# Patient Record
Sex: MALE
[De-identification: names, ages, dates, MRNs, and addresses within clinical notes are randomized per-mention and may not be internally consistent; named-entity substitution may affect disease eponyms.]

---

## 2020-01-27 PROBLEM — Z00.00 ENCOUNTER FOR PREVENTIVE HEALTH EXAMINATION: Status: ACTIVE | Noted: 2020-01-27

## 2020-02-06 ENCOUNTER — APPOINTMENT (OUTPATIENT)
Dept: NEUROLOGY | Facility: CLINIC | Age: 60
End: 2020-02-06

## 2020-06-24 ENCOUNTER — TRANSCRIPTION ENCOUNTER (OUTPATIENT)
Age: 60
End: 2020-06-24

## 2020-06-24 ENCOUNTER — APPOINTMENT (OUTPATIENT)
Dept: NEUROLOGY | Facility: CLINIC | Age: 60
End: 2020-06-24
Payer: COMMERCIAL

## 2020-06-24 VITALS — WEIGHT: 207 LBS | BODY MASS INDEX: 28.98 KG/M2 | HEIGHT: 71 IN

## 2020-06-24 DIAGNOSIS — Z78.9 OTHER SPECIFIED HEALTH STATUS: ICD-10-CM

## 2020-06-24 DIAGNOSIS — Z87.438 PERSONAL HISTORY OF OTHER DISEASES OF MALE GENITAL ORGANS: ICD-10-CM

## 2020-06-24 DIAGNOSIS — Z81.8 FAMILY HISTORY OF OTHER MENTAL AND BEHAVIORAL DISORDERS: ICD-10-CM

## 2020-06-24 PROCEDURE — 99215 OFFICE O/P EST HI 40 MIN: CPT | Mod: 95

## 2020-06-24 RX ORDER — ALPRAZOLAM 0.25 MG/1
0.25 TABLET ORAL
Refills: 0 | Status: ACTIVE | COMMUNITY
Start: 2020-06-24

## 2020-06-24 RX ORDER — ATORVASTATIN CALCIUM 20 MG/1
20 TABLET, FILM COATED ORAL
Qty: 30 | Refills: 3 | Status: ACTIVE | COMMUNITY
Start: 2020-06-24

## 2020-06-24 RX ORDER — ALFUZOSIN HYDROCHLORIDE 10 MG/1
10 TABLET, EXTENDED RELEASE ORAL DAILY
Refills: 0 | Status: ACTIVE | COMMUNITY
Start: 2020-06-24

## 2020-06-24 NOTE — DISCUSSION/SUMMARY
[FreeTextEntry1] : Mr. Diaz is a 59 year old man who has been experiencing anxiety and vague symptoms of dizziness/floatiness.\par He is not describing any focal symptoms or any vertigo.\par He had a cardiac workup.\par \par A limited examination performed via televisit is non-focal.\par \par It does seem that anxiety may be contributing to his symptoms.\par He had problems with Effexor - urinary symptoms.\par He is interested in trying an SSRI. I am prescribing sertraline 25 mg x 2 weeks and then 50 mg/day. Side effects were discussed including the possibility of worsening mood.\par \par He should return to yoga when his neck feels better.\par \par Persistent perceptual postural dizziness may also be a problem. \par \par We discussed MRI brain as well. He would like to see if symptoms improve with sertraline and if not he is agreeable to MRI brain. \par EEG can also be considered if not feeling better.\par \par Records from previous office will also be retrieved. \par \par He should call with any questions or concerns.

## 2020-06-24 NOTE — DATA REVIEWED
[de-identified] : Echocardiogram 4/24/20:\par Normal LV size, with concentric remodeling and grossly normal systolic function with estimated EF of 60-65%\par Reduced LV compliance\par Normal size left atrium and right atrium.\par No sigfnicant valvular abnormality.\par \par Treadmill Stress test 4/29/20: Normal\par \par Carotid duplex 4/24/20: No evidence of significant carotid stenosis bilaterally

## 2020-06-24 NOTE — REVIEW OF SYSTEMS
[Anxiety] : anxiety [Nocturia] : nocturia [As Noted in HPI] : as noted in HPI [Negative] : Gastrointestinal

## 2020-06-24 NOTE — PHYSICAL EXAM
[FreeTextEntry1] : Examination:\par Patient is well appearing\par Neurological Examination:\par Mental Status: Awake, alert. Oriented to person, place, time\par Language: No aphasia\par Cranial Nerves:\par  EOMI, No facial weakness, tongue protrudes in the midline\par Motor Exam: No pronator drift. Barrel roll intact. Fine motor movements intact in hands\par Able to stand up from chair without difficulty\par Able to stand on one foot\par Sensory: intact on self exam\par Reflexes: Unable to examine\par Cerebellar: Finger to nose intact bilaterally\par \par \par

## 2020-06-24 NOTE — HISTORY OF PRESENT ILLNESS
[Home] : at home, [unfilled] , at the time of the visit. [Medical Office: ([facility])___] : at the medical office located in  [Verbal consent obtained from patient] : the patient, [unfilled] [FreeTextEntry1] : \par This is a telehealth visit that was performed with the originating site at the patient’s home and the distant site of my office at [address] [place] [address].\par Two way audio and visual technology was used. \par Verbal consent to participate in the telephone/video visit was obtained in lieu of in-person signature due to the coronavirus emergency.\par This particular visit occurred during the 2020 COVID-19 emergency. I discussed with the patient the nature of our telehealth visits, that:\par -I would evaluate the patient and recommend diagnostics and treatments based on my assessment.\par -Our sessions are not being recorded.\par -The patient acknowledged the risk of unsecure transmission of his/her information.\par -Our team would provide follow up care in person if/when the patient needs it.\par \par He reports that recently he has been feeling very anxious.\par Anxiety has been present for about one year.\par He is not sure if he is anxious about his medical symptoms or if his symptoms are a result of anxiety.\par \par He says that his father recently passed away on 1/29/20. He had dementia at the end of life.\par He was working with a psychiatrist. \par \par His symptoms include feelings of lightheadedness and wooziness.\par He feels a little out of it. \par He does not feel as if he is going to lose his balance but he does not feel "all there". He feels "floaty" as if He might lose his bearings. \par He does not notice this feeling when he is focused on an activity.\par He sometimes feels this when he steps off his bike after a ride.\par \par When he was under a lot of stress he found that he had difficulty with concentration. \par He was on Effexor previously. He tried to restart Effexor but his urologist wanted him to stop it.\par Effexor was previously helpful for his anxiety. He has not been on it steadily for the last two years. \par He was working with a psychiatrist prior to the pandemic. \par He had blurry vision when he was on Lexapro for one week.\par \par He rarely takes Xanax 0.25 mg to help him sleep.\par He usually sleeps pretty well. He will get up at 3 AM to urinate. \par He wakes up at about 7 AM. He finds himself dreading the day when he first wakes up.\par He does not believe that he snores except for when he is on his back.\par \par He had a cardiac workup including echocardiogram, stress test and carotid ultrasound.\par \par He has had some "stress headaches" arising from the back of his neck over the last 1-2 months. Aspirin usually is helpful. \par He denies having any weakness on one side more than the other.\par He does not have any focal numbness/tingling. \par \par \par His mother and father had dementia, starting in their 80s.

## 2020-06-25 ENCOUNTER — TRANSCRIPTION ENCOUNTER (OUTPATIENT)
Age: 60
End: 2020-06-25

## 2020-07-09 ENCOUNTER — TRANSCRIPTION ENCOUNTER (OUTPATIENT)
Age: 60
End: 2020-07-09

## 2020-07-24 ENCOUNTER — TRANSCRIPTION ENCOUNTER (OUTPATIENT)
Age: 60
End: 2020-07-24

## 2020-07-30 ENCOUNTER — TRANSCRIPTION ENCOUNTER (OUTPATIENT)
Age: 60
End: 2020-07-30

## 2020-07-31 ENCOUNTER — TRANSCRIPTION ENCOUNTER (OUTPATIENT)
Age: 60
End: 2020-07-31

## 2020-08-01 ENCOUNTER — APPOINTMENT (OUTPATIENT)
Dept: MRI IMAGING | Facility: CLINIC | Age: 60
End: 2020-08-01

## 2020-08-01 ENCOUNTER — OUTPATIENT (OUTPATIENT)
Dept: OUTPATIENT SERVICES | Facility: HOSPITAL | Age: 60
LOS: 1 days | End: 2020-08-01

## 2020-08-01 DIAGNOSIS — Z00.00 ENCOUNTER FOR GENERAL ADULT MEDICAL EXAMINATION WITHOUT ABNORMAL FINDINGS: ICD-10-CM

## 2020-08-01 DIAGNOSIS — R42 DIZZINESS AND GIDDINESS: ICD-10-CM

## 2020-08-03 ENCOUNTER — TRANSCRIPTION ENCOUNTER (OUTPATIENT)
Age: 60
End: 2020-08-03

## 2020-08-04 ENCOUNTER — TRANSCRIPTION ENCOUNTER (OUTPATIENT)
Age: 60
End: 2020-08-04

## 2020-08-07 ENCOUNTER — APPOINTMENT (OUTPATIENT)
Dept: NEUROLOGY | Facility: CLINIC | Age: 60
End: 2020-08-07
Payer: COMMERCIAL

## 2020-08-07 VITALS
HEIGHT: 71 IN | HEART RATE: 96 BPM | WEIGHT: 200 LBS | BODY MASS INDEX: 28 KG/M2 | SYSTOLIC BLOOD PRESSURE: 125 MMHG | TEMPERATURE: 98.2 F | DIASTOLIC BLOOD PRESSURE: 88 MMHG

## 2020-08-07 DIAGNOSIS — R42 DIZZINESS AND GIDDINESS: ICD-10-CM

## 2020-08-07 PROCEDURE — 99214 OFFICE O/P EST MOD 30 MIN: CPT

## 2020-08-07 NOTE — PHYSICAL EXAM
[FreeTextEntry1] : Examination:\par Constitutional: normal, no apparent distress\par Eyes: normal conjunctiva b/l, no ptosis, visual fields full\par Respiratory: no respiratory distress, normal effort, normal auscultation\par Cardiovascular: normal rate, rhythm, no murmurs\par Neck: supple, no masses\par Vascular: carotids normal\par Skin: normal color, no rashes\par Psych: normal mood, affect\par \par Neurological:\par Memory: normal memory, oriented to person, place, time\par Language intact/no aphasia\par Cranial Nerves: II-XII intact, Pupils equally round and reactive to light, ocular muscles/movements intact, no ptosis, no facial weakness, tongue protrudes normally in the midline, \par Motor: normal tone, no pronator drift, full strength in all four extremities in the proximal and distal muscle groups. No bradykinesia\par Coordination: Fine motor movements intact, rapid alternating movements intact, finger to nose intact bilaterally. No rest tremor. No cogwheeling\par Sensory: intact to light touch\par DTRs: symmetric, 2 in b/l triceps, 2 in b/l biceps, 2 in b/l brachioradialis, 2 in bilateral patellars, 2 in bilateral Achilles, Babinskis negative bilaterally\par Gait: narrow based, steady, able to walk on heels, toes, tandem gait\par \par

## 2020-08-07 NOTE — HISTORY OF PRESENT ILLNESS
[FreeTextEntry1] : I last saw Mr. Diaz on 6/24/20.\par \par He summarized today that a feeling of dizziness/spaciness when walking started in September.\par He saw ENT and had various tests.\par He was told his symptoms were due to anxiety.\par He tried Effexor which has helped in the past but he recently developed urinary retention.\par Sertraline was started about 6 weeks ago. He does not feel that it is particularly effective.\par He has not noticed an improvement with his anxiety or with the dizziness.\par \par He has not noticed any problems with his balance.\par His symptoms are most noticeable in open spaces.\par His eyes feel tired.\par He says that he feels "hyper anxious"\par Anxiety seems to be worse at night. If he wakes up at night his mind starts to race.\par \par He is noticing some neck stiffness and some vibration down his left arm.\par

## 2020-08-07 NOTE — DISCUSSION/SUMMARY
[FreeTextEntry1] : Mr. Diaz is a 59 year old man who has been experiencing anxiety and vague symptoms of dizziness/floatiness.\par He is not describing any focal symptoms or any vertigo.\par He had a cardiac workup.\par \par He has a non-focal neurological examination.\par \par He is concerned about a diagnosis of Parkinson's disease. I reassured him that there are no signs of parkinsonism on his examination at this time. \par \par Dizziness\par -Possibly Persistent Perceptual Postural Dizziness\par -MRI brain and IACs to r/o any structural pathology. He did not tolerate last MRI. Will try at [facility]\par -Vestibular Therapy\par \par Anxiety\par -Trial of increasing Sertraline to 75 mg/day\par \par Neck pain\par -Cervical radiculopathy\par -Physical therapy\par -Avoid chiropractic manipulations of neck\par \par Insomnia\par -  We discussed the following recommendations to improve sleep hygiene and insomnia.\par - The bed should only be used for sleep and intimacy. No reading or watching television in bed.\par -  Avoid trying to sleep/go to bed only when sleepy. If you cannot fall asleep at the beginning of the night or in the middle of the night get out of bed after 15 minutes and do something relaxing (read, watch television, etc.)\par -  Wake up at the same time every day.\par -  No naps during the day\par -  No caffeine after noon \par -  Do not watch the clock at night.\par - Avoid direct sunlight late in the day/ wear sunglasses after 4 PM\par - Do not use the computer/tablets for 1-2 hours prior to bedtime\par - Schedule thinking time early in the evening and have relaxation time for one hour before bed\par - Practice relaxation training that can be done at night if you cannot sleep\par - Exercise for 20 minutes in the late afternoon/early evening or take a hot bath 2-4 hours before bedtime\par \par If no improvement in symptoms with above, will get EEG.\par \par f/u 2 months, sooner prn.\par

## 2020-08-07 NOTE — DATA REVIEWED
[de-identified] : Echocardiogram 4/24/20:\par Normal LV size, with concentric remodeling and grossly normal systolic function with estimated EF of 60-65%\par Reduced LV compliance\par Normal size left atrium and right atrium.\par No sigfnicant valvular abnormality.\par \par Treadmill Stress test 4/29/20: Normal\par \par Carotid duplex 4/24/20: No evidence of significant carotid stenosis bilaterally

## 2020-08-11 ENCOUNTER — TRANSCRIPTION ENCOUNTER (OUTPATIENT)
Age: 60
End: 2020-08-11

## 2020-09-15 ENCOUNTER — TRANSCRIPTION ENCOUNTER (OUTPATIENT)
Age: 60
End: 2020-09-15

## 2020-09-21 ENCOUNTER — TRANSCRIPTION ENCOUNTER (OUTPATIENT)
Age: 60
End: 2020-09-21

## 2020-10-05 ENCOUNTER — APPOINTMENT (OUTPATIENT)
Dept: NEUROLOGY | Facility: CLINIC | Age: 60
End: 2020-10-05
Payer: COMMERCIAL

## 2020-10-05 VITALS
TEMPERATURE: 98 F | WEIGHT: 205 LBS | DIASTOLIC BLOOD PRESSURE: 82 MMHG | BODY MASS INDEX: 28.7 KG/M2 | SYSTOLIC BLOOD PRESSURE: 128 MMHG | HEART RATE: 85 BPM | HEIGHT: 71 IN

## 2020-10-05 DIAGNOSIS — F41.9 ANXIETY DISORDER, UNSPECIFIED: ICD-10-CM

## 2020-10-05 DIAGNOSIS — R42 DIZZINESS AND GIDDINESS: ICD-10-CM

## 2020-10-05 DIAGNOSIS — M54.12 RADICULOPATHY, CERVICAL REGION: ICD-10-CM

## 2020-10-05 PROCEDURE — 99213 OFFICE O/P EST LOW 20 MIN: CPT

## 2020-10-05 RX ORDER — SERTRALINE 25 MG/1
25 TABLET, FILM COATED ORAL
Qty: 90 | Refills: 5 | Status: ACTIVE | COMMUNITY
Start: 2020-10-05 | End: 1900-01-01

## 2020-10-05 NOTE — DISCUSSION/SUMMARY
[FreeTextEntry1] : Mr. Diaz is a 59 year old man who has been experiencing anxiety and vague symptoms of dizziness/floatiness.\par He is not describing any focal symptoms or any vertigo.\par He had a cardiac workup.\par \par He has a non-focal neurological examination.\par \par He is concerned about a diagnosis of Parkinson's disease. I reassured him that there are no signs of parkinsonism on his examination at this time. \par \par Dizziness\par -Possibly Persistent Perceptual Postural Dizziness\par -MRI brain and IACs to r/o any structural pathology was ordered. This was not done. He will try to reschedule.\par -He had an unremarkable CT head at [place].\par -Continue Vestibular Therapy\par \par Anxiety\par -Continue Sertraline to 75 mg/day. If necessary will increase the dose to 100 mg/day.\par \par Neck pain\par -Cervical radiculopathy\par -At this time he is doing much better. Can continue exercise as tolerated but avoid excessive exercise.\par -Avoid chiropractic manipulations of neck\par \par Insomnia\par - We discussed the following recommendations to improve sleep hygiene and insomnia.\par - The bed should only be used for sleep and intimacy. No reading or watching television in bed.\par - Avoid trying to sleep/go to bed only when sleepy. If you cannot fall asleep at the beginning of the night or in the middle of the night get out of bed after 15 minutes and do something relaxing (read, watch television, etc.)\par - Wake up at the same time every day.\par - No naps during the day\par - No caffeine after noon \par - Do not watch the clock at night.\par - Avoid direct sunlight late in the day/ wear sunglasses after 4 PM\par - Do not use the computer/tablets for 1-2 hours prior to bedtime\par - Schedule thinking time early in the evening and have relaxation time for one hour before bed\par - Practice relaxation training that can be done at night if you cannot sleep\par - Exercise for 20 minutes in the late afternoon/early evening or take a hot bath 2-4 hours before bedtime\par \par If no improvement in symptoms with above, will get EEG.\par \par f/u 4 months, sooner prn.\par

## 2020-10-05 NOTE — PHYSICAL EXAM
[FreeTextEntry1] : Examination:\par Constitutional: normal, no apparent distress\par Eyes: normal conjunctiva b/l, no ptosis, visual fields full\par Respiratory: no respiratory distress, normal effort, normal auscultation\par Cardiovascular: normal rate, rhythm, no murmurs\par Neck: supple, no masses\par Vascular: carotids normal\par Skin: normal color, no rashes\par Psych: normal mood, affect\par \par Neurological:\par Memory: normal memory, oriented to person, place, time\par Language intact/no aphasia\par Cranial Nerves: II-XII intact, Pupils equally round and reactive to light, ocular muscles/movements intact, no ptosis, no facial weakness, tongue protrudes normally in the midline, \par Motor: normal tone, no pronator drift, full strength in all four extremities in the proximal and distal muscle groups\par Coordination: Fine motor movements intact, rapid alternating movements intact, finger to nose intact bilaterally\par Sensory: intact to light touch\par DTRs: symmetric, 2/4 in b/l biceps, radialis, patellars\par Gait: narrow based, steady\par

## 2020-10-05 NOTE — DATA REVIEWED
[de-identified] : Echocardiogram 4/24/20:\par Normal LV size, with concentric remodeling and grossly normal systolic function with estimated EF of 60-65%\par Reduced LV compliance\par Normal size left atrium and right atrium.\par No sigfnicant valvular abnormality.\par \par Treadmill Stress test 4/29/20: Normal\par \par Carotid duplex 4/24/20: No evidence of significant carotid stenosis bilaterally

## 2020-10-05 NOTE — HISTORY OF PRESENT ILLNESS
[FreeTextEntry1] : I last saw Mr. Diaz on 8/7/20.\par He went to the ED at [place] since the last visit.\par \par A CT scan of the head was unremarkable.\par A chest x-ray showed a suspicious lesion. He was treated with antibiotics and a f/u CT scan shows that it is resolving.\par \par His dizziness is better overall.\par he says that he is feeling progressively better but it is a very slow progress.\par Over the weekend he was able to play tennis in the mornings.\par He was not able to do an MRI brain yet.\par He has started vestibular therapy. He is noticing that his biggest difficulty is balance with his eyes closed.\par He has been doing vestibular exercises on his own at home.\par \par He finds that his anxiety is improved.\par Sertraline seems to be helping but he does not think that it is working as well as Effexor did in the past. He is taking 75 mg/day.\par \par His neck and left arm pain is much improved.\par \par He feels much better since he can be active.\par \par

## 2020-12-03 ENCOUNTER — APPOINTMENT (OUTPATIENT)
Dept: ORTHOPEDIC SURGERY | Facility: CLINIC | Age: 60
End: 2020-12-03
Payer: COMMERCIAL

## 2020-12-03 VITALS
WEIGHT: 215.03 LBS | DIASTOLIC BLOOD PRESSURE: 89 MMHG | HEIGHT: 71 IN | SYSTOLIC BLOOD PRESSURE: 150 MMHG | TEMPERATURE: 97.1 F | BODY MASS INDEX: 30.1 KG/M2 | HEART RATE: 103 BPM | OXYGEN SATURATION: 97 %

## 2020-12-03 DIAGNOSIS — M79.89 OTHER SPECIFIED SOFT TISSUE DISORDERS: ICD-10-CM

## 2020-12-03 PROCEDURE — 99203 OFFICE O/P NEW LOW 30 MIN: CPT

## 2020-12-03 PROCEDURE — 99072 ADDL SUPL MATRL&STAF TM PHE: CPT

## 2020-12-03 NOTE — HISTORY OF PRESENT ILLNESS
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

## 2020-12-03 NOTE — PHYSICAL EXAM
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  At this point patient was recommended to consider surgical procedure arthrotomy of the left knee and resection soft tissue mass left knee

## 2021-03-16 ENCOUNTER — APPOINTMENT (OUTPATIENT)
Dept: NEUROLOGY | Facility: CLINIC | Age: 61
End: 2021-03-16

## 2021-04-19 RX ORDER — SERTRALINE HYDROCHLORIDE 50 MG/1
50 TABLET, FILM COATED ORAL
Qty: 45 | Refills: 3 | Status: ACTIVE | COMMUNITY
Start: 2020-06-24 | End: 1900-01-01

## 2022-01-07 ENCOUNTER — RX RENEWAL (OUTPATIENT)
Age: 62
End: 2022-01-07

## 2024-09-12 ENCOUNTER — APPOINTMENT (OUTPATIENT)
Dept: DERMATOLOGY | Facility: CLINIC | Age: 64
End: 2024-09-12
Payer: COMMERCIAL

## 2024-09-12 PROCEDURE — 99203 OFFICE O/P NEW LOW 30 MIN: CPT | Mod: 25

## 2024-09-12 PROCEDURE — 17003 DESTRUCT PREMALG LES 2-14: CPT

## 2024-09-12 PROCEDURE — 17000 DESTRUCT PREMALG LESION: CPT

## 2025-01-31 ENCOUNTER — APPOINTMENT (OUTPATIENT)
Dept: DERMATOLOGY | Facility: CLINIC | Age: 65
End: 2025-01-31
Payer: COMMERCIAL

## 2025-01-31 PROCEDURE — 99213 OFFICE O/P EST LOW 20 MIN: CPT | Mod: 25

## 2025-01-31 PROCEDURE — 17110 DESTRUCTION B9 LES UP TO 14: CPT
